# Patient Record
Sex: MALE | Race: WHITE | NOT HISPANIC OR LATINO | Employment: FULL TIME | URBAN - METROPOLITAN AREA
[De-identification: names, ages, dates, MRNs, and addresses within clinical notes are randomized per-mention and may not be internally consistent; named-entity substitution may affect disease eponyms.]

---

## 2023-08-04 LAB
ALANINE AMINOTRANSFERASE (SGPT) (U/L) IN SER/PLAS: 77 U/L (ref 10–52)
ALBUMIN (G/DL) IN SER/PLAS: 4.4 G/DL (ref 3.4–5)
ALKALINE PHOSPHATASE (U/L) IN SER/PLAS: 93 U/L (ref 33–120)
ANION GAP IN SER/PLAS: 11 MMOL/L (ref 10–20)
ASPARTATE AMINOTRANSFERASE (SGOT) (U/L) IN SER/PLAS: 61 U/L (ref 9–39)
BILIRUBIN TOTAL (MG/DL) IN SER/PLAS: 0.9 MG/DL (ref 0–1.2)
CALCIUM (MG/DL) IN SER/PLAS: 9.1 MG/DL (ref 8.6–10.3)
CARBON DIOXIDE, TOTAL (MMOL/L) IN SER/PLAS: 29 MMOL/L (ref 21–32)
CHLORIDE (MMOL/L) IN SER/PLAS: 103 MMOL/L (ref 98–107)
CHOLESTEROL (MG/DL) IN SER/PLAS: 185 MG/DL (ref 0–199)
CHOLESTEROL IN HDL (MG/DL) IN SER/PLAS: 52.2 MG/DL
CHOLESTEROL/HDL RATIO: 3.5
CREATININE (MG/DL) IN SER/PLAS: 0.89 MG/DL (ref 0.5–1.3)
GFR MALE: >90 ML/MIN/1.73M2
GLUCOSE (MG/DL) IN SER/PLAS: 97 MG/DL (ref 74–99)
LDL: 107 MG/DL (ref 0–99)
POTASSIUM (MMOL/L) IN SER/PLAS: 4.1 MMOL/L (ref 3.5–5.3)
PROTEIN TOTAL: 7.4 G/DL (ref 6.4–8.2)
SODIUM (MMOL/L) IN SER/PLAS: 139 MMOL/L (ref 136–145)
TRIGLYCERIDE (MG/DL) IN SER/PLAS: 128 MG/DL (ref 0–149)
UREA NITROGEN (MG/DL) IN SER/PLAS: 17 MG/DL (ref 6–23)
VLDL: 26 MG/DL (ref 0–40)

## 2023-10-11 ENCOUNTER — HOSPITAL ENCOUNTER (OUTPATIENT)
Dept: RADIOLOGY | Facility: CLINIC | Age: 48
Discharge: HOME | End: 2023-10-11
Payer: COMMERCIAL

## 2023-10-11 DIAGNOSIS — R91.8 OTHER NONSPECIFIC ABNORMAL FINDING OF LUNG FIELD: ICD-10-CM

## 2023-10-11 PROCEDURE — 71250 CT THORAX DX C-: CPT

## 2023-10-11 PROCEDURE — 71250 CT THORAX DX C-: CPT | Performed by: RADIOLOGY

## 2023-10-14 ENCOUNTER — TELEPHONE (OUTPATIENT)
Dept: PRIMARY CARE | Facility: CLINIC | Age: 48
End: 2023-10-14
Payer: COMMERCIAL

## 2023-10-24 PROBLEM — I10 HTN (HYPERTENSION): Status: ACTIVE | Noted: 2023-10-24

## 2023-10-24 PROBLEM — E78.5 HYPERLIPIDEMIA: Status: ACTIVE | Noted: 2023-10-24

## 2023-10-24 PROBLEM — J90 PLEURAL EFFUSION, LEFT: Status: ACTIVE | Noted: 2023-10-24

## 2023-10-24 PROBLEM — R07.89 CHEST TIGHTNESS: Status: ACTIVE | Noted: 2023-10-24

## 2023-10-24 PROBLEM — I25.10 CAD (CORONARY ARTERY DISEASE): Status: ACTIVE | Noted: 2023-10-24

## 2023-10-24 PROBLEM — Z95.1 S/P CABG (CORONARY ARTERY BYPASS GRAFT): Status: ACTIVE | Noted: 2023-10-24

## 2023-10-24 PROBLEM — R94.39 ABNORMAL STRESS ECHOCARDIOGRAM: Status: ACTIVE | Noted: 2023-10-24

## 2023-10-24 PROBLEM — I24.1: Status: ACTIVE | Noted: 2023-10-24

## 2023-10-24 PROBLEM — R06.09 EXERTIONAL DYSPNEA: Status: ACTIVE | Noted: 2023-10-24

## 2023-10-24 PROBLEM — R91.8 MULTIPLE LUNG NODULES ON CT: Status: ACTIVE | Noted: 2023-10-24

## 2023-10-24 RX ORDER — METOPROLOL TARTRATE 50 MG/1
50 TABLET ORAL EVERY 12 HOURS
COMMUNITY
Start: 2023-10-03 | End: 2023-11-21

## 2023-10-24 RX ORDER — SIMVASTATIN 40 MG/1
TABLET, FILM COATED ORAL
COMMUNITY
Start: 2015-09-02 | End: 2024-01-25 | Stop reason: WASHOUT

## 2023-10-24 RX ORDER — ASPIRIN 81 MG/1
81 TABLET ORAL DAILY
COMMUNITY
End: 2023-10-31 | Stop reason: SDUPTHER

## 2023-10-24 RX ORDER — ATORVASTATIN CALCIUM 40 MG/1
40 TABLET, FILM COATED ORAL DAILY
COMMUNITY
End: 2024-01-25 | Stop reason: ALTCHOICE

## 2023-10-24 RX ORDER — AMLODIPINE BESYLATE 5 MG/1
5 TABLET ORAL DAILY
COMMUNITY
Start: 2023-09-20 | End: 2024-01-25 | Stop reason: SDUPTHER

## 2023-10-24 RX ORDER — IBUPROFEN 400 MG/1
400 TABLET ORAL 3 TIMES DAILY
COMMUNITY
Start: 2022-12-27 | End: 2023-10-26 | Stop reason: ALTCHOICE

## 2023-10-26 ENCOUNTER — TELEMEDICINE (OUTPATIENT)
Dept: PRIMARY CARE | Facility: CLINIC | Age: 48
End: 2023-10-26
Payer: COMMERCIAL

## 2023-10-26 DIAGNOSIS — R91.8 MULTIPLE LUNG NODULES ON CT: Primary | ICD-10-CM

## 2023-10-26 PROCEDURE — 99212 OFFICE O/P EST SF 10 MIN: CPT | Performed by: NURSE PRACTITIONER

## 2023-10-26 SDOH — ECONOMIC STABILITY: FOOD INSECURITY: WITHIN THE PAST 12 MONTHS, YOU WORRIED THAT YOUR FOOD WOULD RUN OUT BEFORE YOU GOT MONEY TO BUY MORE.: NEVER TRUE

## 2023-10-26 SDOH — ECONOMIC STABILITY: FOOD INSECURITY: WITHIN THE PAST 12 MONTHS, THE FOOD YOU BOUGHT JUST DIDN'T LAST AND YOU DIDN'T HAVE MONEY TO GET MORE.: NEVER TRUE

## 2023-10-26 ASSESSMENT — PATIENT HEALTH QUESTIONNAIRE - PHQ9
SUM OF ALL RESPONSES TO PHQ9 QUESTIONS 1 AND 2: 0
1. LITTLE INTEREST OR PLEASURE IN DOING THINGS: NOT AT ALL
2. FEELING DOWN, DEPRESSED OR HOPELESS: NOT AT ALL

## 2023-10-26 ASSESSMENT — COLUMBIA-SUICIDE SEVERITY RATING SCALE - C-SSRS
2. HAVE YOU ACTUALLY HAD ANY THOUGHTS OF KILLING YOURSELF?: NO
6. HAVE YOU EVER DONE ANYTHING, STARTED TO DO ANYTHING, OR PREPARED TO DO ANYTHING TO END YOUR LIFE?: NO
1. IN THE PAST MONTH, HAVE YOU WISHED YOU WERE DEAD OR WISHED YOU COULD GO TO SLEEP AND NOT WAKE UP?: NO

## 2023-10-26 ASSESSMENT — ENCOUNTER SYMPTOMS
LOSS OF SENSATION IN FEET: 0
DEPRESSION: 0
OCCASIONAL FEELINGS OF UNSTEADINESS: 0

## 2023-10-26 NOTE — PROGRESS NOTES
Subjective   Patient ID: Jorge Adames is a 47 y.o. male who presents for Follow-up (Jorge Adames has a telephone follow up visit with a CT scan prior./).  HPI  47 y.o. male     Former cigarette smoker and quit about 5 years ago and started smoking electronic cigarettes.  Smoked about 40 years one pack per day. No personal history of cancer. No family history of cancer.     CT chest dated 10/11/2023  The previously seen nodule, in the left lower lobe on the examination of 10/11/2022 is not present on the current study. Also slight pleural thickening, seen in the superior  segment of the right lower lobe on the previous examination is also  not present on this study. These might have been inflammatory. No  peribronchial thickening. No evidence of interstitial lung disease.    CT chest October 11, 2022  4 mm left lower lobe nodule image 105. Mild bibasilar atelectasis. Posterior pleural-based nodular density on the right measuring 5 mm image 65. No pleural effusion.    Review of Systems  Review of systems: Present-feeling well. Not present-chills, fatigue and fever.  Respiratory: Not present-difficulty breathing, cough, bloody sputum.  Cardiovascular: Not present-abnormal blood pressure, chest pain, edema, fainting, leg pain, leg swelling, palpitations, dyspnea on exertion, shortness of breath and slow heart rate.    Objective   There were no vitals taken for this visit.     Physical Exam    Assessment/Plan   Diagnoses and all orders for this visit:  Multiple lung nodules on CT

## 2023-10-31 DIAGNOSIS — I10 PRIMARY HYPERTENSION: Primary | ICD-10-CM

## 2023-11-02 RX ORDER — ASPIRIN 81 MG/1
81 TABLET ORAL DAILY
Qty: 90 TABLET | Refills: 3 | Status: SHIPPED | OUTPATIENT
Start: 2023-11-02

## 2023-11-17 DIAGNOSIS — I10 HYPERTENSION, UNSPECIFIED TYPE: Primary | ICD-10-CM

## 2023-11-21 RX ORDER — METOPROLOL TARTRATE 50 MG/1
50 TABLET ORAL EVERY 12 HOURS
Qty: 180 TABLET | Refills: 3 | Status: SHIPPED | OUTPATIENT
Start: 2023-11-21 | End: 2024-01-25 | Stop reason: SDUPTHER

## 2024-01-25 ENCOUNTER — OFFICE VISIT (OUTPATIENT)
Dept: CARDIOLOGY | Facility: CLINIC | Age: 49
End: 2024-01-25
Payer: COMMERCIAL

## 2024-01-25 VITALS
DIASTOLIC BLOOD PRESSURE: 102 MMHG | SYSTOLIC BLOOD PRESSURE: 166 MMHG | BODY MASS INDEX: 29.9 KG/M2 | HEIGHT: 74 IN | OXYGEN SATURATION: 96 % | WEIGHT: 233 LBS | HEART RATE: 95 BPM

## 2024-01-25 DIAGNOSIS — I15.9 SECONDARY HYPERTENSION: Primary | ICD-10-CM

## 2024-01-25 DIAGNOSIS — I10 HYPERTENSION, UNSPECIFIED TYPE: ICD-10-CM

## 2024-01-25 DIAGNOSIS — E78.2 MIXED HYPERLIPIDEMIA: ICD-10-CM

## 2024-01-25 PROCEDURE — 1036F TOBACCO NON-USER: CPT | Performed by: INTERNAL MEDICINE

## 2024-01-25 PROCEDURE — 3077F SYST BP >= 140 MM HG: CPT | Performed by: INTERNAL MEDICINE

## 2024-01-25 PROCEDURE — 93000 ELECTROCARDIOGRAM COMPLETE: CPT | Performed by: INTERNAL MEDICINE

## 2024-01-25 PROCEDURE — 99214 OFFICE O/P EST MOD 30 MIN: CPT | Performed by: INTERNAL MEDICINE

## 2024-01-25 PROCEDURE — 3080F DIAST BP >= 90 MM HG: CPT | Performed by: INTERNAL MEDICINE

## 2024-01-25 RX ORDER — LOSARTAN POTASSIUM 100 MG/1
100 TABLET ORAL DAILY
Qty: 30 TABLET | Refills: 11 | Status: SHIPPED | OUTPATIENT
Start: 2024-01-25 | End: 2025-01-24

## 2024-01-25 RX ORDER — METOPROLOL TARTRATE 50 MG/1
50 TABLET ORAL EVERY 12 HOURS
Qty: 180 TABLET | Refills: 3 | Status: SHIPPED | OUTPATIENT
Start: 2024-01-25

## 2024-01-25 RX ORDER — AMLODIPINE BESYLATE 5 MG/1
5 TABLET ORAL DAILY
Qty: 90 TABLET | Refills: 3 | Status: SHIPPED | OUTPATIENT
Start: 2024-01-25 | End: 2025-01-24

## 2024-01-25 RX ORDER — ROSUVASTATIN CALCIUM 40 MG/1
40 TABLET, COATED ORAL DAILY
Qty: 90 TABLET | Refills: 3 | Status: SHIPPED | OUTPATIENT
Start: 2024-01-25 | End: 2025-01-24

## 2024-01-25 NOTE — PROGRESS NOTES
"Steve Adames is a 48 y.o. male.    Chief Complaint:  Follow-up coronary artery disease, coronary artery bypass grafting.    HPI    Gets occasional chest discomfort but only with lifting heavy objects.  There is seems to be more associated with musculoskeletal discomfort.  No typical anginal symptoms.  Otherwise doing well after coronary artery bypass grafting.  Does have some problems with taking statin therapy.  Does check his blood pressures at home and they have been somewhat elevated.    Coronary artery bypass grafting was performed on October 13, 2022. The patient had a left internal mammary artery to the anterior descending, the right internal mammary artery to the right coronary artery, and a radial artery to the second marginal circumflex coronary artery. He had a left atrial appendage ligation.     His past cardiac history is unremarkable. Coronary artery disease risk factors include a history of hyperlipidemia and a 1 pack/day smoking history. There is no history of diabetes. No family history of premature coronary artery disease.     Past medical history: Significant for hernia repair and cholecystectomy.     Social history: Former smoker with a 25-pack-year smoking history. Works as a .     Allergies  Medication    · No Known Drug Allergies   Recorded By: Raiza Ruiz; 8/24/2022 3:17:04 PM     Family History  Mother    · No pertinent family history  Father    · No pertinent family history      Review of Systems   Cardiovascular:  Positive for chest pain.   All other systems reviewed and are negative.      Visit Vitals  BP (!) 166/102   Pulse 95   Ht 1.88 m (6' 2\")   Wt 106 kg (233 lb)   SpO2 96%   BMI 29.92 kg/m²   Smoking Status Former   BSA 2.35 m²        Objective     Constitutional:       Appearance: Not in distress.   Neck:      Vascular: JVD normal.   Pulmonary:      Breath sounds: Normal breath sounds.   Cardiovascular:      Normal rate. Regular rhythm. S1 with " normal intensity. S2 with normal intensity.       Murmurs: There is no murmur.      No gallop.    Pulses:     Intact distal pulses.   Edema:     Peripheral edema absent.   Abdominal:      General: Bowel sounds are normal.   Neurological:      Mental Status: Alert and oriented to person, place and time.         Lab Review:   Lab Results   Component Value Date     08/04/2023    K 4.1 08/04/2023     08/04/2023    CO2 29 08/04/2023    BUN 17 08/04/2023    CREATININE 0.89 08/04/2023    GLUCOSE 97 08/04/2023    CALCIUM 9.1 08/04/2023     Lab Results   Component Value Date    CHOL 185 08/04/2023    TRIG 128 08/04/2023    HDL 52.2 08/04/2023       Assessment:    1.   Coronary artery disease.  Status post coronary artery bypass grafting.  Had arteriograms.  Also had left atrial appendage ligation.  Doing well post bypass surgery.  Has a fairly physically vigorous job and does not get angina.    2.  Hypertension.  Not well-controlled.  Will add losartan 100 mg daily in addition to his current medications.    3.  Hyperlipidemia.  Most recent LDL is 107.  We are going to try rosuvastatin 3 days/week and then if he is not at goal we will try to uptitrate him to where he is taking the medications on a daily basis.    We will see him in follow-up in 6 months and get labs prior to his next visit.  We will then see him on a yearly basis.

## 2024-01-25 NOTE — PATIENT INSTRUCTIONS
Stop atorvastatin 40 mg daily    Start rosuvastatin 40 mg daily    Continue amlodipine and metoprolol     Start losartan 100 mg for blood pressure    Get blood tests before your next visit.

## 2024-09-12 ENCOUNTER — APPOINTMENT (OUTPATIENT)
Dept: CARDIOLOGY | Facility: CLINIC | Age: 49
End: 2024-09-12
Payer: COMMERCIAL

## 2024-09-13 ENCOUNTER — APPOINTMENT (OUTPATIENT)
Dept: CARDIOLOGY | Facility: CLINIC | Age: 49
End: 2024-09-13
Payer: COMMERCIAL

## 2024-09-13 VITALS
HEIGHT: 74 IN | OXYGEN SATURATION: 97 % | BODY MASS INDEX: 30.54 KG/M2 | DIASTOLIC BLOOD PRESSURE: 90 MMHG | SYSTOLIC BLOOD PRESSURE: 148 MMHG | WEIGHT: 238 LBS | HEART RATE: 73 BPM

## 2024-09-13 DIAGNOSIS — R06.09 EXERTIONAL DYSPNEA: ICD-10-CM

## 2024-09-13 DIAGNOSIS — Z95.1 S/P CABG (CORONARY ARTERY BYPASS GRAFT): Primary | ICD-10-CM

## 2024-09-13 DIAGNOSIS — R07.89 CHEST TIGHTNESS: ICD-10-CM

## 2024-09-13 DIAGNOSIS — I10 HYPERTENSION, UNSPECIFIED TYPE: ICD-10-CM

## 2024-09-13 DIAGNOSIS — I25.10 CORONARY ARTERY DISEASE INVOLVING NATIVE CORONARY ARTERY OF NATIVE HEART WITHOUT ANGINA PECTORIS: ICD-10-CM

## 2024-09-13 DIAGNOSIS — E78.2 MIXED HYPERLIPIDEMIA: ICD-10-CM

## 2024-09-13 PROBLEM — I24.1: Status: RESOLVED | Noted: 2023-10-24 | Resolved: 2024-09-13

## 2024-09-13 PROCEDURE — 3008F BODY MASS INDEX DOCD: CPT | Performed by: INTERNAL MEDICINE

## 2024-09-13 PROCEDURE — 3077F SYST BP >= 140 MM HG: CPT | Performed by: INTERNAL MEDICINE

## 2024-09-13 PROCEDURE — 1036F TOBACCO NON-USER: CPT | Performed by: INTERNAL MEDICINE

## 2024-09-13 PROCEDURE — 3080F DIAST BP >= 90 MM HG: CPT | Performed by: INTERNAL MEDICINE

## 2024-09-13 PROCEDURE — 99214 OFFICE O/P EST MOD 30 MIN: CPT | Performed by: INTERNAL MEDICINE

## 2024-09-13 RX ORDER — LOSARTAN POTASSIUM AND HYDROCHLOROTHIAZIDE 12.5; 1 MG/1; MG/1
1 TABLET ORAL DAILY
Qty: 90 TABLET | Refills: 3 | Status: SHIPPED | OUTPATIENT
Start: 2024-09-13 | End: 2025-09-13

## 2024-09-13 RX ORDER — METOPROLOL TARTRATE 50 MG/1
50 TABLET ORAL EVERY 12 HOURS
Qty: 180 TABLET | Refills: 3 | Status: SHIPPED | OUTPATIENT
Start: 2024-09-13

## 2024-09-13 RX ORDER — AMLODIPINE BESYLATE 5 MG/1
5 TABLET ORAL DAILY
Qty: 90 TABLET | Refills: 3 | Status: SHIPPED | OUTPATIENT
Start: 2024-09-13 | End: 2025-09-13

## 2024-09-13 RX ORDER — ROSUVASTATIN CALCIUM 40 MG/1
40 TABLET, COATED ORAL DAILY
Qty: 90 TABLET | Refills: 3 | Status: SHIPPED | OUTPATIENT
Start: 2024-09-13 | End: 2025-09-13

## 2024-09-13 NOTE — PROGRESS NOTES
Steve Adames is a 48 y.o. male.    Chief Complaint:  Follow-up coronary disease, coronary artery bypass grafting.    HPI    His chest pain symptoms have essentially resolved.  No anginal symptoms.  He is working full-time and does not have any difficulty with work.  Does check his blood pressures at home and they have been variable but generally somewhat increased.     Coronary artery bypass grafting was performed on October 13, 2022. The patient had a left internal mammary artery to the anterior descending, the right internal mammary artery to the right coronary artery, and a radial artery to the second marginal circumflex coronary artery. He had a left atrial appendage ligation.     His past cardiac history is unremarkable. Coronary artery disease risk factors include a history of hyperlipidemia and a 1 pack/day smoking history. There is no history of diabetes. No family history of premature coronary artery disease.     Past medical history: Significant for hernia repair and cholecystectomy.     Social history: Former smoker with a 25-pack-year smoking history. Works as a .      Allergies  Medication    · No Known Drug Allergies   Recorded By: Raiza Ruiz; 8/24/2022 3:17:04 PM     Family History  Mother    · No pertinent family history  Father    · No pertinent family history        Review of Systems   Cardiovascular:  Positive for chest pain.   All other systems reviewed and are negative.      Current Outpatient Medications   Medication Sig Dispense Refill    aspirin 81 mg EC tablet Take 1 tablet (81 mg) by mouth once daily. 90 tablet 3    amLODIPine (Norvasc) 5 mg tablet Take 1 tablet (5 mg) by mouth once daily. 90 tablet 3    losartan-hydrochlorothiazide (Hyzaar) 100-12.5 mg tablet Take 1 tablet by mouth once daily. 90 tablet 3    metoprolol tartrate (Lopressor) 50 mg tablet Take 1 tablet by mouth every 12 hours. 180 tablet 3    rosuvastatin (Crestor) 40 mg tablet Take 1  "tablet (40 mg) by mouth once daily. 90 tablet 3     No current facility-administered medications for this visit.        Visit Vitals  /90 (BP Location: Right arm)   Pulse 73   Ht 1.88 m (6' 2\")   Wt 108 kg (238 lb)   SpO2 97%   BMI 30.56 kg/m²   Smoking Status Former   BSA 2.37 m²        Objective     Constitutional:       Appearance: Not in distress.   Neck:      Vascular: JVD normal.   Pulmonary:      Breath sounds: Normal breath sounds.   Cardiovascular:      Normal rate. Regular rhythm. S1 with normal intensity. S2 with normal intensity.       Murmurs: There is no murmur.      No gallop.    Pulses:     Intact distal pulses.   Edema:     Peripheral edema absent.   Abdominal:      General: Bowel sounds are normal.   Neurological:      Mental Status: Alert and oriented to person, place and time.         Lab Review:   Lab Results   Component Value Date     08/04/2023    K 4.1 08/04/2023     08/04/2023    CO2 29 08/04/2023    BUN 17 08/04/2023    CREATININE 0.89 08/04/2023    GLUCOSE 97 08/04/2023    CALCIUM 9.1 08/04/2023     Lab Results   Component Value Date    CHOL 185 08/04/2023    TRIG 128 08/04/2023    HDL 52.2 08/04/2023       Assessment:    1.  Coronary disease.  Status post coronary bypass grafting 2 years ago.  Doing well without angina.    2.  Hypertension.  Blood pressures are consistently elevated today.  Will change his losartan to losartan -12.5 mg daily.  We have asked him to get blood work in 2 weeks including lipid profile.    3.  Hyperlipidemia.  Will get lipid panel on his next visit.  No recent blood work.  "

## 2024-09-13 NOTE — PATIENT INSTRUCTIONS
We are changing the losartan 100 mg to losartan-hydrochlorothiazide 100-12.5 mg daily    Continue the metoprolol amlodipine and the rosuvastatin    Get blood tests in two weeks.  We will see you back in one year.

## 2024-10-29 DIAGNOSIS — I10 PRIMARY HYPERTENSION: ICD-10-CM

## 2024-10-30 RX ORDER — ASPIRIN 81 MG/1
81 TABLET ORAL DAILY
Qty: 90 TABLET | Refills: 3 | Status: SHIPPED | OUTPATIENT
Start: 2024-10-30

## 2025-09-18 ENCOUNTER — APPOINTMENT (OUTPATIENT)
Dept: CARDIOLOGY | Facility: CLINIC | Age: 50
End: 2025-09-18
Payer: COMMERCIAL